# Patient Record
Sex: FEMALE | Race: WHITE | URBAN - METROPOLITAN AREA
[De-identification: names, ages, dates, MRNs, and addresses within clinical notes are randomized per-mention and may not be internally consistent; named-entity substitution may affect disease eponyms.]

---

## 2021-02-02 ENCOUNTER — APPOINTMENT (OUTPATIENT)
Dept: VACCINE CLINIC | Facility: HOSPITAL | Age: 71
End: 2021-02-02

## 2024-04-29 PROCEDURE — 88305 TISSUE EXAM BY PATHOLOGIST: CPT | Performed by: ORAL & MAXILLOFACIAL SURGERY

## 2024-04-30 ENCOUNTER — LAB REQUISITION (OUTPATIENT)
Dept: LAB | Facility: HOSPITAL | Age: 74
End: 2024-04-30
Payer: MEDICARE

## 2024-04-30 DIAGNOSIS — K14.8 OTHER DISEASES OF TONGUE: ICD-10-CM

## 2024-05-01 LAB
LAB AP CASE REPORT: NORMAL
PATH REPORT.FINAL DX SPEC: NORMAL
PATH REPORT.GROSS SPEC: NORMAL

## 2024-10-22 NOTE — PROGRESS NOTES
EMG and Nerve Conduction Studies    Patient Name: Angela Macdonald    Date of Study:10/25/24    Referring Provider:DR. HAM    History:    BUE-CTS    Results:    The complete report includes the data sheets.     Prior to starting the procedure, the patient's identity was verified, pertinent available records were reviewed, the nature of the procedure was explained, the appropriate site of the exam were confirmed directly with the patient, and a pre-procedure pause was performed for final verification of all the above.    1.  The median sensory distal latency and motor distal latency is prolonged bilaterally to a greater extent on the left than the right.  The median motor forearm conduction velocity is normal bilaterally.    2.  The bilateral ulnar sensory and ulnar motor studies are normal.  The ulnar motor conduction velocity both below and across elbow segments are normal.    3.  Electromyography of the deltoid, triceps, extensor digitorum, and first dorsal interosseous muscles were normal bilaterally.  Electromyography of the left abductor pollicis brevis muscle showed increased insertional activity and a discrete motor unit firing pattern.  On the right abductor pollicis the insertional activity was increased with 1+ polyphasic motor units and decreased recruitment.    Impression:    This is an abnormal study.  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  There is no evidence of focal ulnar neuropathy at the elbow or neurogenic change in the muscles examined in the C5-T1 myotomes except for the abductor pollicis brevis muscles.      Electronically signed by :    Joseph Seipel, M.D.  October 29, 2024

## 2024-10-25 ENCOUNTER — TELEPHONE (OUTPATIENT)
Dept: NEUROLOGY | Facility: CLINIC | Age: 74
End: 2024-10-25

## 2024-10-25 ENCOUNTER — PROCEDURE VISIT (OUTPATIENT)
Dept: NEUROLOGY | Facility: CLINIC | Age: 74
End: 2024-10-25
Payer: MEDICARE

## 2024-10-25 VITALS
WEIGHT: 260 LBS | SYSTOLIC BLOOD PRESSURE: 160 MMHG | HEIGHT: 64 IN | BODY MASS INDEX: 44.39 KG/M2 | DIASTOLIC BLOOD PRESSURE: 92 MMHG | HEART RATE: 112 BPM

## 2024-10-25 DIAGNOSIS — G56.03 CARPAL TUNNEL SYNDROME, BILATERAL: Primary | ICD-10-CM

## 2024-10-25 PROCEDURE — 95910 NRV CNDJ TEST 7-8 STUDIES: CPT | Performed by: PSYCHIATRY & NEUROLOGY

## 2024-10-25 PROCEDURE — 95886 MUSC TEST DONE W/N TEST COMP: CPT | Performed by: PSYCHIATRY & NEUROLOGY

## 2024-10-25 RX ORDER — IRBESARTAN AND HYDROCHLOROTHIAZIDE 300; 12.5 MG/1; MG/1
1 TABLET, FILM COATED ORAL DAILY
COMMUNITY

## 2024-10-25 RX ORDER — ASPIRIN 81 MG/1
81 TABLET ORAL DAILY
COMMUNITY

## 2024-10-25 RX ORDER — CLOTRIMAZOLE AND BETAMETHASONE DIPROPIONATE 10; .64 MG/G; MG/G
CREAM TOPICAL
COMMUNITY

## 2024-10-25 RX ORDER — INSULIN GLARGINE 100 [IU]/ML
INJECTION, SOLUTION SUBCUTANEOUS
COMMUNITY

## 2024-10-25 RX ORDER — SORBITOL SOLUTION 70 %
SOLUTION, ORAL MISCELLANEOUS
COMMUNITY

## 2024-10-25 RX ORDER — DIPHENOXYLATE HYDROCHLORIDE AND ATROPINE SULFATE 2.5; .025 MG/1; MG/1
1 TABLET ORAL DAILY
COMMUNITY

## 2024-10-25 RX ORDER — TRIAMCINOLONE ACETONIDE 1 MG/G
CREAM TOPICAL
COMMUNITY

## 2024-10-25 RX ORDER — INSULIN GLARGINE 100 [IU]/ML
INJECTION, SOLUTION SUBCUTANEOUS EVERY 12 HOURS SCHEDULED
COMMUNITY

## 2024-10-25 RX ORDER — NAPROXEN 500 MG/1
TABLET ORAL EVERY 12 HOURS SCHEDULED
COMMUNITY

## 2024-10-25 RX ORDER — TERCONAZOLE 0.4 %
CREAM WITH APPLICATOR VAGINAL
COMMUNITY

## 2024-10-25 RX ORDER — OMEPRAZOLE MAGNESIUM 2.5 MG/1
GRANULE, DELAYED RELEASE ORAL
COMMUNITY

## 2024-10-25 RX ORDER — ATORVASTATIN CALCIUM 40 MG/1
TABLET, FILM COATED ORAL
COMMUNITY

## 2024-10-25 RX ORDER — INSULIN LISPRO 100 [IU]/ML
INJECTION, SOLUTION INTRAVENOUS; SUBCUTANEOUS
COMMUNITY

## 2024-10-25 RX ORDER — HYDROCHLOROTHIAZIDE 25 MG/1
1 TABLET ORAL DAILY
COMMUNITY

## 2024-10-25 RX ORDER — LOSARTAN POTASSIUM 100 MG/1
100 TABLET ORAL DAILY
COMMUNITY